# Patient Record
Sex: MALE | Race: WHITE | URBAN - METROPOLITAN AREA
[De-identification: names, ages, dates, MRNs, and addresses within clinical notes are randomized per-mention and may not be internally consistent; named-entity substitution may affect disease eponyms.]

---

## 2024-10-14 ENCOUNTER — OFFICE VISIT (OUTPATIENT)
Dept: URBAN - METROPOLITAN AREA CLINIC 78 | Facility: CLINIC | Age: 74
End: 2024-10-14
Payer: COMMERCIAL

## 2024-10-14 ENCOUNTER — DASHBOARD ENCOUNTERS (OUTPATIENT)
Age: 74
End: 2024-10-14

## 2024-10-14 VITALS
HEIGHT: 73 IN | RESPIRATION RATE: 14 BRPM | WEIGHT: 241.8 LBS | TEMPERATURE: 98.1 F | BODY MASS INDEX: 32.05 KG/M2 | HEART RATE: 96 BPM | DIASTOLIC BLOOD PRESSURE: 63 MMHG | SYSTOLIC BLOOD PRESSURE: 98 MMHG

## 2024-10-14 DIAGNOSIS — Z99.89 CPAP (CONTINUOUS POSITIVE AIRWAY PRESSURE) DEPENDENCE: ICD-10-CM

## 2024-10-14 DIAGNOSIS — J44.9 COPD, MILD: ICD-10-CM

## 2024-10-14 DIAGNOSIS — Z12.11 SCREEN FOR COLON CANCER: ICD-10-CM

## 2024-10-14 DIAGNOSIS — K29.50 CHRONIC GASTRITIS WITHOUT BLEEDING, UNSPECIFIED GASTRITIS TYPE: ICD-10-CM

## 2024-10-14 DIAGNOSIS — K21.9 CHRONIC GERD: ICD-10-CM

## 2024-10-14 DIAGNOSIS — R05.3 CHRONIC COUGH: ICD-10-CM

## 2024-10-14 DIAGNOSIS — Z85.46 PERSONAL HISTORY OF PROSTATE CANCER: ICD-10-CM

## 2024-10-14 PROBLEM — 235595009: Status: ACTIVE | Noted: 2024-10-14

## 2024-10-14 PROBLEM — 428262008: Status: ACTIVE | Noted: 2024-10-14

## 2024-10-14 PROBLEM — 313296004: Status: ACTIVE | Noted: 2024-10-14

## 2024-10-14 PROBLEM — 429487005: Status: ACTIVE | Noted: 2024-10-14

## 2024-10-14 PROBLEM — 8493009: Status: ACTIVE | Noted: 2024-10-14

## 2024-10-14 PROCEDURE — 99204 OFFICE O/P NEW MOD 45 MIN: CPT | Performed by: INTERNAL MEDICINE

## 2024-10-14 RX ORDER — LEVOTHYROXINE SODIUM 0.1 MG/1
TABLET ORAL
Qty: 0 | Refills: 0 | Status: ACTIVE | COMMUNITY
Start: 1900-01-01

## 2024-10-14 RX ORDER — CITALOPRAM 40 MG/1
TABLET ORAL
Qty: 0 | Refills: 0 | Status: ACTIVE | COMMUNITY
Start: 1900-01-01

## 2024-10-14 RX ORDER — BUPROPION HYDROCHLORIDE 300 MG/1
TABLET, EXTENDED RELEASE ORAL
Qty: 0 | Refills: 0 | Status: ACTIVE | COMMUNITY
Start: 1900-01-01

## 2024-10-14 RX ORDER — ASPIRIN 81 MG/1
TABLET, COATED ORAL
Qty: 0 | Refills: 0 | Status: ACTIVE | COMMUNITY
Start: 1900-01-01

## 2024-10-14 RX ORDER — METFORMIN HYDROCHLORIDE 500 MG/1
TABLET, COATED ORAL
Qty: 0 | Refills: 0 | Status: ACTIVE | COMMUNITY
Start: 1900-01-01

## 2024-10-14 RX ORDER — MONTELUKAST SODIUM 10 MG/1
TAKE 1 TABLET (10 MG) BY ORAL ROUTE ONCE DAILY IN THE EVENING TABLET, FILM COATED ORAL 1
Qty: 0 | Refills: 0 | Status: ACTIVE | COMMUNITY
Start: 1900-01-01

## 2024-10-14 RX ORDER — OMEPRAZOLE 20 MG/1
CAPSULE, DELAYED RELEASE ORAL
Qty: 0 | Refills: 0 | Status: ACTIVE | COMMUNITY
Start: 1900-01-01

## 2024-10-14 RX ORDER — FLUTICASONE PROPIONATE 50 UG/1
SPRAY, METERED NASAL
Qty: 0 | Refills: 0 | Status: ACTIVE | COMMUNITY
Start: 1900-01-01

## 2024-10-14 NOTE — HPI-TODAY'S VISIT:
- Reason for consultation: Establish as a patient, recent episode of suspected hiatal hernia - Chief complaint: Indigestion and discomfort in upper abdomen one week ago - History of presenting complaint: - Developed indigestion and discomfort in upper abdomen one week ago after eating greasy pizza - Felt like the discomfort "went back down" the following Monday - No current symptoms reported - Past medical history: - History of GERD - Prostate cancer, status post prostatectomy - Cholecystectomy - Possible COPD or reactive airway disease - Uses CPAP machine - Medications: - Omeprazole 20 mg in the morning with breakfast - Recently added omeprazole 20 mg at night - Corticosteroid inhaler (name not recalled) - Rescue inhaler as needed - Social history: - Former smoker, quit in 2000 - Allergies: Not mentioned

## 2024-10-14 NOTE — PHYSICAL EXAM GASTROINTESTINAL
Abdomen , soft, nontender, nondistended , no guarding or rigidity , no masses palpable , normal bowel sounds ,- Surgical scar noted, consistent with cholecystectomy - Surgical scar noted, consistent with prostatectomy Liver and Spleen , no hepatomegaly present , liver nontender , spleen not palpable

## 2024-10-24 ENCOUNTER — TELEPHONE ENCOUNTER (OUTPATIENT)
Dept: URBAN - METROPOLITAN AREA CLINIC 78 | Facility: CLINIC | Age: 74
End: 2024-10-24

## 2024-12-04 ENCOUNTER — OFFICE VISIT (OUTPATIENT)
Dept: URBAN - METROPOLITAN AREA MEDICAL CENTER 10 | Facility: MEDICAL CENTER | Age: 74
End: 2024-12-04
Payer: COMMERCIAL

## 2024-12-04 DIAGNOSIS — K22.2 ACQUIRED ESOPHAGEAL RING: ICD-10-CM

## 2024-12-04 DIAGNOSIS — Z12.11 COLON CANCER SCREENING: ICD-10-CM

## 2024-12-04 DIAGNOSIS — B37.81 CANDIDA: ICD-10-CM

## 2024-12-04 DIAGNOSIS — D12.0 ADENOMA OF CECUM: ICD-10-CM

## 2024-12-04 DIAGNOSIS — K29.50 ANTRAL GASTRITIS: ICD-10-CM

## 2024-12-04 DIAGNOSIS — K20.0 ALLERGIC EOSINOPHILIC ESOPHAGITIS: ICD-10-CM

## 2024-12-04 DIAGNOSIS — D12.3 ADENOMA OF TRANSVERSE COLON: ICD-10-CM

## 2024-12-04 DIAGNOSIS — K31.89 OTHER DISEASES OF STOMACH AND DUODENUM: ICD-10-CM

## 2024-12-04 DIAGNOSIS — K31.7 BENIGN GASTRIC POLYP: ICD-10-CM

## 2024-12-04 PROCEDURE — 45385 COLONOSCOPY W/LESION REMOVAL: CPT | Performed by: INTERNAL MEDICINE

## 2024-12-04 PROCEDURE — 45381 COLONOSCOPY SUBMUCOUS NJX: CPT | Performed by: INTERNAL MEDICINE

## 2024-12-04 PROCEDURE — 43239 EGD BIOPSY SINGLE/MULTIPLE: CPT | Performed by: INTERNAL MEDICINE

## 2024-12-04 PROCEDURE — 43249 ESOPH EGD DILATION <30 MM: CPT | Performed by: INTERNAL MEDICINE

## 2024-12-04 RX ORDER — MONTELUKAST SODIUM 10 MG/1
TAKE 1 TABLET (10 MG) BY ORAL ROUTE ONCE DAILY IN THE EVENING TABLET, FILM COATED ORAL 1
Qty: 0 | Refills: 0 | Status: ACTIVE | COMMUNITY
Start: 1900-01-01

## 2024-12-04 RX ORDER — CITALOPRAM 40 MG/1
TABLET ORAL
Qty: 0 | Refills: 0 | Status: ACTIVE | COMMUNITY
Start: 1900-01-01

## 2024-12-04 RX ORDER — METFORMIN HYDROCHLORIDE 500 MG/1
TABLET, COATED ORAL
Qty: 0 | Refills: 0 | Status: ACTIVE | COMMUNITY
Start: 1900-01-01

## 2024-12-04 RX ORDER — FLUTICASONE PROPIONATE 50 UG/1
SPRAY, METERED NASAL
Qty: 0 | Refills: 0 | Status: ACTIVE | COMMUNITY
Start: 1900-01-01

## 2024-12-04 RX ORDER — ASPIRIN 81 MG/1
TABLET, COATED ORAL
Qty: 0 | Refills: 0 | Status: ACTIVE | COMMUNITY
Start: 1900-01-01

## 2024-12-04 RX ORDER — BUPROPION HYDROCHLORIDE 300 MG/1
TABLET, EXTENDED RELEASE ORAL
Qty: 0 | Refills: 0 | Status: ACTIVE | COMMUNITY
Start: 1900-01-01

## 2024-12-04 RX ORDER — OMEPRAZOLE 20 MG/1
CAPSULE, DELAYED RELEASE ORAL
Qty: 0 | Refills: 0 | Status: ACTIVE | COMMUNITY
Start: 1900-01-01

## 2024-12-04 RX ORDER — LEVOTHYROXINE SODIUM 0.1 MG/1
TABLET ORAL
Qty: 0 | Refills: 0 | Status: ACTIVE | COMMUNITY
Start: 1900-01-01

## 2024-12-05 ENCOUNTER — TELEPHONE ENCOUNTER (OUTPATIENT)
Dept: URBAN - METROPOLITAN AREA CLINIC 78 | Facility: CLINIC | Age: 74
End: 2024-12-05

## 2024-12-05 RX ORDER — FLUCONAZOLE 100 MG/1
1 TABLET TABLET ORAL ONCE DAILY
Qty: 14 | Refills: 0 | OUTPATIENT
Start: 2024-12-06 | End: 2024-12-20

## 2025-01-15 ENCOUNTER — OFFICE VISIT (OUTPATIENT)
Dept: URBAN - METROPOLITAN AREA CLINIC 78 | Facility: CLINIC | Age: 75
End: 2025-01-15
Payer: COMMERCIAL

## 2025-01-15 VITALS
HEIGHT: 73 IN | SYSTOLIC BLOOD PRESSURE: 136 MMHG | WEIGHT: 243 LBS | BODY MASS INDEX: 32.2 KG/M2 | DIASTOLIC BLOOD PRESSURE: 80 MMHG | HEART RATE: 101 BPM | TEMPERATURE: 98 F

## 2025-01-15 DIAGNOSIS — J44.9 COPD, MILD: ICD-10-CM

## 2025-01-15 DIAGNOSIS — Z86.0101 PERSONAL HISTORY OF ADENOMATOUS AND SERRATED COLON POLYPS: ICD-10-CM

## 2025-01-15 DIAGNOSIS — Z85.46 PERSONAL HISTORY OF PROSTATE CANCER: ICD-10-CM

## 2025-01-15 DIAGNOSIS — K20.0 EOSINOPHILIC ESOPHAGITIS: ICD-10-CM

## 2025-01-15 DIAGNOSIS — R05.3 CHRONIC COUGH: ICD-10-CM

## 2025-01-15 DIAGNOSIS — Z99.89 CPAP (CONTINUOUS POSITIVE AIRWAY PRESSURE) DEPENDENCE: ICD-10-CM

## 2025-01-15 DIAGNOSIS — K57.90 DIVERTICULOSIS: ICD-10-CM

## 2025-01-15 PROBLEM — 397881000: Status: ACTIVE | Noted: 2025-01-15

## 2025-01-15 PROBLEM — 235599003: Status: ACTIVE | Noted: 2025-01-15

## 2025-01-15 PROCEDURE — 99214 OFFICE O/P EST MOD 30 MIN: CPT | Performed by: INTERNAL MEDICINE

## 2025-01-15 RX ORDER — METFORMIN HYDROCHLORIDE 500 MG/1
TABLET, COATED ORAL
Qty: 0 | Refills: 0 | Status: ACTIVE | COMMUNITY
Start: 1900-01-01

## 2025-01-15 RX ORDER — OMEPRAZOLE 20 MG/1
1 CAPSULE 30 MINUTES BEFORE MORNING MEAL CAPSULE, DELAYED RELEASE ORAL TWICE A DAY
Qty: 60 | Refills: 3

## 2025-01-15 RX ORDER — BUPROPION HYDROCHLORIDE 300 MG/1
TABLET, EXTENDED RELEASE ORAL
Qty: 0 | Refills: 0 | Status: ACTIVE | COMMUNITY
Start: 1900-01-01

## 2025-01-15 RX ORDER — ASPIRIN 81 MG/1
TABLET, COATED ORAL
Qty: 0 | Refills: 0 | Status: ACTIVE | COMMUNITY
Start: 1900-01-01

## 2025-01-15 RX ORDER — OMEPRAZOLE 20 MG/1
CAPSULE, DELAYED RELEASE ORAL
Qty: 0 | Refills: 0 | Status: ACTIVE | COMMUNITY
Start: 1900-01-01

## 2025-01-15 RX ORDER — LEVOTHYROXINE SODIUM 0.1 MG/1
TABLET ORAL
Qty: 0 | Refills: 0 | Status: ACTIVE | COMMUNITY
Start: 1900-01-01

## 2025-01-15 RX ORDER — FLUTICASONE PROPIONATE 50 UG/1
SPRAY, METERED NASAL
Qty: 0 | Refills: 0 | Status: ACTIVE | COMMUNITY
Start: 1900-01-01

## 2025-01-15 RX ORDER — MONTELUKAST SODIUM 10 MG/1
TAKE 1 TABLET (10 MG) BY ORAL ROUTE ONCE DAILY IN THE EVENING TABLET, FILM COATED ORAL 1
Qty: 0 | Refills: 0 | Status: ACTIVE | COMMUNITY
Start: 1900-01-01

## 2025-01-15 RX ORDER — CITALOPRAM 40 MG/1
TABLET ORAL
Qty: 0 | Refills: 0 | Status: ACTIVE | COMMUNITY
Start: 1900-01-01

## 2025-01-15 NOTE — HPI-TODAY'S VISIT:
-Subjective: - Presenting for follow-up of recent endoscopy and colonoscopy performed on 12/04/2024. - Wife present during consultation to assist with history. Objective: - Colonoscopy findings (12/04/2024): - 25mm tubular villous adenoma removed from cecum via piecemeal EMR technique - Three additional precancerous polyps removed - Incidental finding of diverticulosis, no active diverticulitis - Upper endoscopy findings (12/04/2024): - Patchy white plaques and inflammation in esophagus - Fundic gland polyp in stomach (benign) - Biopsies showed: - Esophagus: 55-60 eosinophils per high power field, consistent with eosinophilic esophagitis (EoE) - Stomach: Preserved architecture with mild inflammation, no infection - Duodenum: Mild inflammation - Small bowel: Mild inflammation - Candida infection noted in esophageal biopsies    Reason for consultation: Establish as a patient, recent episode of suspected hiatal hernia - Chief complaint: Indigestion and discomfort in upper abdomen one week ago - History of presenting complaint: - Developed indigestion and discomfort in upper abdomen one week ago after eating greasy pizza - Felt like the discomfort "went back down" the following Monday - No current symptoms reported - Past medical history: - History of GERD - Prostate cancer, status post prostatectomy - Cholecystectomy - Possible COPD or reactive airway disease - Uses CPAP machine - Medications: - Omeprazole 20 mg in the morning with breakfast - Recently added omeprazole 20 mg at night - Corticosteroid inhaler (name not recalled) - Rescue inhaler as needed - Social history: - Former smoker, quit in 2000 - Allergies: Not mentioned